# Patient Record
(demographics unavailable — no encounter records)

---

## 2025-01-06 NOTE — PHYSICAL EXAM
[General Appearance - Alert] : alert [Sclera] : the sclera and conjunctiva were normal [General Appearance - In No Acute Distress] : in no acute distress [Extraocular Movements] : extraocular movements were intact [Outer Ear] : the ears and nose were normal in appearance [Neck Appearance] : the appearance of the neck was normal [Respiration, Rhythm And Depth] : normal respiratory rhythm and effort [Heart Rate And Rhythm] : heart rate was normal and rhythm regular [Heart Sounds] : normal S1 and S2 [Abdomen Soft] : soft [Abdomen Tenderness] : non-tender [Cervical Lymph Nodes Enlarged Anterior Bilaterally] : anterior cervical [Supraclavicular Lymph Nodes Enlarged Bilaterally] : supraclavicular [No CVA Tenderness] : no ~M costovertebral angle tenderness [Motor Tone] : muscle strength and tone were normal [] : no rash [No Focal Deficits] : no focal deficits [Impaired Insight] : insight and judgment were intact [Mood] : the mood was normal [FreeTextEntry1] : No synovitis or effusion on exam noted today; Good ROM in b/l shoulders, no pelvic/girdle stiffness and able to stand up without using her hands

## 2025-01-06 NOTE — ASSESSMENT
[FreeTextEntry1] : 61-year-old Portuguese speaking female w/ her daughter, Hannah; w/ patient who has swan neck deformities of hands w/ +RF =244; strong +CCP; +RO; +MIKI 1: 320 speckled, homo, nuclear w/ high ESR/CRP consistent w/ RA w/ marginated erosions on hand xrays & MRI c-spine stating "mild synovitis of atlantoaxial interval. The rt lateral mass of C2 is mildly anterior aligned w/ respect to C1, likely positional vs atlantoaxial rotatory fixation" on Enbrel & HCQ. She has a nodule by her left elbow for the past few years & improved.  Seropositive Erosive RA: stable without synovitis today -reviewed labs 1/4/25 w/ CBC ok; mildly high ESR=23 (ok for age); high CRP Normal now; CMP w/ low glu=63 (asymptomatic and told no fasting needed for labs); DS-DNA negative; random urine prot/creat ratio=0.1; 3/23/24 hepatitis negative; HIV negative; quantiferon gold negative; 1/6/24 + MIKI 1:320 homogenous; DS-DNA neg; C3/C4 WNL; urine prot/creat ratio=0.1 -discussed r/b/s of refilling Enbrel 50mg subQ weekly & hold if infection and alert us -discussed r/b/s of refilling Plaquenil 300 mg PO total, closer to wt based w/ pt agreeable and prescription sent as below -states she is saw Optho, Dr. Lorenzo Mota 9/2024 with eye exam ok on HCQ w/ f/u 3/2025 and knows to alert us if any concerns -if uncontrolled in the future will consider Arava then -off MTX w/ Leukopenia on it -xray of b/l hands 2/10/2021 w/ swan neck deformities; marginated erosions -xray b/l feet 2/102021 w/ erosions of left hallux IP and medial 1st metatarsal; bunions -xray b/l knees 2/10/2021 ok -xray b/l shoulders 2/10/2021 w/ right AC arthrosis; w/ improved ROM  Cervicalgia: improved -MRI c-spine 3/3/2021 w/ "mild synovitis of atlantoaxial interval. The rt lateral mass of C2 is mildly anterior aligned w/ respect to C1, likely positional vs atlantoaxial rotatory fixation." -on TNF-I, Enbrel to help -xray c-spine 2/102021 w/ AA instability; retrolisthesis w. dynamic instability -reports she saw Neurosurgery  Leukopenia: Resolved on labs 1/4/25  -low WBC=3.78 (from 4.64 from 3.86 from 3.91 from 3.68 from 3.67 from 3.14 from 3.74) -saw Heme/onc, Dr. Newby who stated it could be from MTX that she is off and still has low WBC so told to f/u with her Heme/onc and discuss  +MIKI 1: 320 speckled/homogenous, nuclear: likely being seen in the setting of RA -Clinically without much symptoms of lupus at this time and educated on symptoms to monitor for in detail if she evolves. -reviewed labs 1/4/24 w/ DS-DNA negative; random urine prot/creat ratio=0.1; 1/6/24 w/ + MIKI 1:320 homogenous; DS-DNA neg; C3/C4 WNL; urine prot/creat ratio= 0.1 -has +TPO abs so +MIKI can be from cross reactivity to thyroid abs  Osteoporosis: Improved Dexa 3/11/23 w/ lowest t-score -3.7 (from -4.2) at Spine -Dexa given to do after 3/11/25 to monitor -Dexa 3/11/23 states mild anterior wedge compression deformity of L1 vertebral body without pain -reviewed MRI L-spine 6/13/23 mild Chronic wedge compression fracture L1, no acute or subacute fracture; DDD of T spine and L-spine -she prefers to continue fosamax over other meds and Dexa is improved on it -discussed r/b/s of refilling Fosamax 70mg PO weekly w/ patient agreeable and prescription sent as below -will inform her dentist she is on fosamax to avoid dental extractions or implants and hold if needed per dentist -encouraged Ca+vitD supplementation -encouraged weight bearing exercises as tolerated. -aside from being postmenopausal checked for secondary causes of osteoporosis 2/20201 w/ normal parathyroid; has Nl TSH; vitD supplementation started  -educated on symptoms to monitor for in detail and alert us if any concerns. -knows to stay up to date on health maintenance w/ PCP -f/u in 10-12 weeks w/ labs and Dexa or sooner as needed

## 2025-04-24 NOTE — PHYSICAL EXAM
[General Appearance - Alert] : alert [General Appearance - In No Acute Distress] : in no acute distress [Sclera] : the sclera and conjunctiva were normal [Extraocular Movements] : extraocular movements were intact [Outer Ear] : the ears and nose were normal in appearance [Neck Appearance] : the appearance of the neck was normal [Respiration, Rhythm And Depth] : normal respiratory rhythm and effort [Heart Rate And Rhythm] : heart rate was normal and rhythm regular [Abdomen Soft] : soft [Heart Sounds] : normal S1 and S2 [Abdomen Tenderness] : non-tender [Cervical Lymph Nodes Enlarged Anterior Bilaterally] : anterior cervical [Supraclavicular Lymph Nodes Enlarged Bilaterally] : supraclavicular [No CVA Tenderness] : no ~M costovertebral angle tenderness [Motor Tone] : muscle strength and tone were normal [] : no rash [No Focal Deficits] : no focal deficits [Impaired Insight] : insight and judgment were intact [Mood] : the mood was normal [FreeTextEntry1] : No synovitis or effusion on exam noted today; Good ROM in b/l shoulders, no pelvic/girdle stiffness and able to stand up without using her hands

## 2025-04-24 NOTE — REASON FOR VISIT
[Follow-Up: _____] : a [unfilled] follow-up visit [FreeTextEntry1] : RA; Osteoporosis; review labs/Dexa/meds.

## 2025-04-24 NOTE — HISTORY OF PRESENT ILLNESS
[FreeTextEntry1] : 61-year-old female, here for follow up w/  named Hai ID # [id number] used to translate; w/ seropositive erosive RA. Patient has swan neck deformities of hands w/ +RF =244; strong +CCP; +RO; +MIKI 1: 320 speckled, homo, nuclear w/ high ESR/CRP consistent w/ RA w/ marginated erosions on hand xrays & MRI c-spine stating "mild synovitis of atlantoaxial interval. The rt lateral mass of C2 is mildly anterior aligned w/ respect to C1, likely positional vs atlantoaxial rotatory fixation" on Enbrel and HCQ. She has a nodule by her left elbow for the past few years & stable. Off MTX due to leukopenia on it .  Today she states she is taking Enbrel and HCQ w/ resolution of joint pain now. Denies any swelling or redness or warmth of any joints on medications now. States she did labs to review in detail today. States she has been taking her Enbrel weekly and tolerating it well. Has good ROM in b/l shoulders, no pelvic/girdle stiffness and able to stand up without using her hands, no temporal pain/unremitting headaches, no vision changes, no jaw pain. She states she is saw Optho, Dr. Lorenzo Mota 4/17/25 with eye exam ok on HCQ w/ f/u 8/2025 and knows to alert us if any concerns. States neck pain is improved and she did see Neurosurgeon and told everything is ok. States the pain in her wrists and ankles and toes are much improved now. Denies any fever/chills, no rashes, no ulcers, no dry eyes, no dry mouth, no raynaud's, no infectious diarrhea or  symptoms at this time.  She has Osteoporosis & states tolerating fosamax well thus far w/ Ca+vit supplementation. States she did recent MRI L-spine after xray L spine 5/27/23 stated L1 mild indeterminate age compression fracture. MRI L-spine 6/13/23 mild Chronic wedge compression fracture L1, no acute or subacute fracture; DDD of T spine and L-spine.

## 2025-04-24 NOTE — ASSESSMENT
[FreeTextEntry1] : 61-year-old Khmer speaking female w/  used w/ patient who has swan neck deformities of hands w/ +RF =244; strong +CCP; +RO; +MIKI 1: 320 speckled, homo, nuclear w/ high ESR/CRP consistent w/ RA w/ marginated erosions on hand xrays & MRI c-spine stating "mild synovitis of atlantoaxial interval. The rt lateral mass of C2 is mildly anterior aligned w/ respect to C1, likely positional vs atlantoaxial rotatory fixation" on Enbrel & HCQ. She has a nodule by her left elbow for the past few years & improved.  Seropositive Erosive RA: stable without synovitis today -reviewed labs 4/17/25 w/ ESR normal now; CRP normal; CBC w/ low WBC=3.26 (from 4.34; hx of it detailed below); CMP ok; hepatitis negative; HIV negative; quantiferon gold negative; 1/4/25 w/ +MIKI 1:320 homogenous; DS-DNA negative; C3 normal; C4 normal; random urine prot/creat ratio=0.1 -discussed r/b/s of refilling Enbrel 50mg subQ weekly & hold if infection and alert us -discussed r/b/s of refilling Plaquenil 300 mg PO total, closer to wt based w/ pt agreeable and prescription sent as below -states she is saw Optho, Dr. Lorenzo Mota 4/17/25 with eye exam ok on HCQ w/ f/u 8/2025 and knows to alert us if any concerns -if uncontrolled in the future will consider Arava then -off MTX w/ Leukopenia on it -xray of b/l hands 2/10/2021 w/ swan neck deformities; marginated erosions -xray b/l feet 2/102021 w/ erosions of left hallux IP and medial 1st metatarsal; bunions -xray b/l knees 2/10/2021 ok -xray b/l shoulders 2/10/2021 w/ right AC arthrosis; w/ improved ROM  Cervicalgia: improved -MRI c-spine 3/3/2021 w/ "mild synovitis of atlantoaxial interval. The rt lateral mass of C2 is mildly anterior aligned w/ respect to C1, likely positional vs atlantoaxial rotatory fixation." -on TNF-I, Enbrel to help -xray c-spine 2/102021 w/ AA instability; retrolisthesis w. dynamic instability -reports she saw Neurosurgery  Leukopenia: WBC=3.26 on labs 4/17/25  -low WBC=3.26 from 4.34 from 3.78 from 4.64 from 3.86 from 3.91 from 3.68 from 3.67 from 3.14 from 3.74) -saw Heme/onc, Dr. Newby who stated it could be from MTX that she is off and still has low WBC so told to f/u and monitor with her Heme/onc please  +MIKI 1: 320 speckled/homogenous, nuclear: likely being seen in the setting of RA -Clinically without much symptoms of lupus at this time and educated on symptoms to monitor for in detail if she evolves. -reviewed labs 1/4/25 w/ +MIKI 1:320 homogenous; DS-DNA negative; C3 normal; C4 normal; random urine prot/creat ratio=0.1 -has +TPO abs so +MIKI can be from cross reactivity to thyroid abs  Osteoporosis: Improved Dexa 4/17/25 w/ lowest t-score -3.3 (from -3.7 from -4.2) at Spine -Dexa 3/11/23 states mild anterior wedge compression deformity of L1 vertebral body without pain -reviewed MRI L-spine 6/13/23 mild Chronic wedge compression fracture L1, no acute or subacute fracture; DDD of T spine and L-spine -she prefers to continue fosamax over other meds and Dexa is improved on it -discussed r/b/s of refilling Fosamax 70mg PO weekly w/ patient agreeable and prescription sent as below -will inform her dentist she is on fosamax to avoid dental extractions or implants and hold if needed per dentist -encouraged Ca+vitD supplementation -encouraged weight bearing exercises as tolerated. -aside from being postmenopausal checked for secondary causes of osteoporosis 2/20201 w/ normal parathyroid; has Nl TSH; vitD supplementation started  -educated on symptoms to monitor for in detail and alert us if any concerns. -knows to stay up to date on health maintenance w/ PCP -f/u in 10-12 weeks w/ labs or sooner as needed.

## 2025-07-24 NOTE — ASSESSMENT
[FreeTextEntry1] : 61-year-old Malawian speaking female w/ her daughter, Hannah; w/ patient who has swan neck deformities of hands w/ +RF =244; strong +CCP; +RO; +MIKI 1: 320 speckled, homo, nuclear w/ high ESR/CRP consistent w/ RA w/ marginated erosions on hand xrays & MRI c-spine stating "mild synovitis of atlantoaxial interval. The rt lateral mass of C2 is mildly anterior aligned w/ respect to C1, likely positional vs atlantoaxial rotatory fixation" on Enbrel & HCQ. She has a nodule by her left elbow for the past few years & improved.  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 Lorenzo Mota 4/17/25 with eye exam ok on HCQ w/ f/u 8/2025 and knows to alert us if any concerns -labs as below requested to monitor for f/u  -if uncontrolled in the future will consider Arava then -off MTX w/ Leukopenia on it -xray of b/l hands 2/10/2021 w/ swan neck deformities; marginated erosions -xray b/l feet 2/102021 w/ erosions of left hallux IP and medial 1st metatarsal; bunions -xray b/l knees 2/10/2021 ok -xray b/l shoulders 2/10/2021 w/ right AC arthrosis; w/ improved ROM  Raised ESR/CRP: -denies any signs of infection; no synovitis; no GCA or PMR symptoms at this time; denies any wt loss or night sweats and knows to stay up to date on health maintenance w/ PCP please  -will monitor on repeat  Cervicalgia: improved -MRI c-spine 3/3/2021 w/ "mild synovitis of atlantoaxial interval. The rt lateral mass of C2 is mildly anterior aligned w/ respect to C1, likely positional vs atlantoaxial rotatory fixation." -on TNF-I, Enbrel to help -xray c-spine 2/102021 w/ AA instability; retrolisthesis w. dynamic instability -reports she saw Neurosurgery  Leukopenia: WBC=3.31 (from 3.26) on labs 6/28/25 -low WBC=3.31 (from 3.26 from 4.34 from 3.78 from 4.64 from 3.86 from 3.91 from 3.68 from 3.67 from 3.14 from 3.74) -saw Heme/onc, Dr. Newby who stated it could be from MTX that she is off and still has low WBC so told to f/u and monitor with her Heme/onc please w/ referral given as requested   +MIKI 1: 320 speckled/homogenous, nuclear: likely being seen in the setting of RA -Clinically without much symptoms of lupus at this time and educated on symptoms to monitor for in detail if she evolves. -reviewed labs 1/4/25 w/ +MIKI 1:320 homogenous; DS-DNA negative; C3 normal; C4 normal; random urine prot/creat ratio=0.1 -has +TPO abs so +MIKI can be from cross reactivity to thyroid abs  Osteoporosis: Improved Dexa 4/17/25 w/ lowest t-score -3.3 (from -3.7 from -4.2) at Spine -Dexa 3/11/23 states mild anterior wedge compression deformity of L1 vertebral body without pain -reviewed MRI L-spine 6/13/23 mild Chronic wedge compression fracture L1, no acute or subacute fracture; DDD of T spine and L-spine -she prefers to continue fosamax over other meds and Dexa is improved on it -discussed r/b/s of refilling Fosamax 70mg PO weekly w/ patient agreeable and prescription sent as below -will inform her dentist she is on fosamax to avoid dental extractions or implants and hold if needed per dentist -encouraged Ca+vitD supplementation -encouraged weight bearing exercises as tolerated. -aside from being postmenopausal checked for secondary causes of osteoporosis 2/20201 w/ normal parathyroid; has Nl TSH; vitD supplementation started  -educated on symptoms to monitor for in detail and alert us if any concerns. -knows to stay up to date on health maintenance w/ PCP -f/u in 10-12 weeks w/ labs or sooner as needed.

## 2025-07-24 NOTE — HISTORY OF PRESENT ILLNESS
[FreeTextEntry1] : Verbal consent given for telehealth video visit on 7/24/25 by patient, via [employer] with visit done from my [facility] at [address] [place] [address] clinic in [place] and patient on her phone in [place].  61-year-old female, for follow up w/ her daughter, Hannah; w/ seropositive erosive RA. Patient has swan neck deformities of hands w/ +RF =244; strong +CCP; +RO; +MIKI 1: 320 speckled, homo, nuclear w/ high ESR/CRP consistent w/ RA w/ marginated erosions on hand xrays & MRI c-spine stating "mild synovitis of atlantoaxial interval. The rt lateral mass of C2 is mildly anterior aligned w/ respect to C1, likely positional vs atlantoaxial rotatory fixation" on Enbrel and HCQ. She has a nodule by her left elbow for the past few years & stable. Off MTX due to leukopenia on it .  Today she states she is taking Enbrel and HCQ w/ resolution of joint pain now. Denies any swelling or redness or warmth of any joints on medications now. States she did labs to review in detail today. States she has been taking her Enbrel weekly and tolerating it well. Has good ROM in b/l shoulders, no pelvic/girdle stiffness and able to stand up without using her hands, no temporal pain/unremitting headaches, no vision changes, no jaw pain. She states she is saw Optho, Dr. Lorenzo Mota 4/17/25 with eye exam ok on HCQ w/ f/u 8/2025 and knows to alert us if any concerns. States neck pain is improved and she did see Neurosurgeon and told everything is ok. States the pain in her wrists and ankles and toes are much improved now. Denies any fever/chills, no rashes, no ulcers, no dry eyes, no dry mouth, no raynaud's, no infectious diarrhea or  symptoms at this time.  She has Osteoporosis & states tolerating fosamax well thus far w/ Ca+vit supplementation. States she did recent MRI L-spine after xray L spine 5/27/23 stated L1 mild indeterminate age compression fracture. MRI L-spine 6/13/23 mild Chronic wedge compression fracture L1, no acute or subacute fracture; DDD of T spine and L-spine.